# Patient Record
Sex: FEMALE | Race: WHITE | Employment: OTHER | URBAN - METROPOLITAN AREA
[De-identification: names, ages, dates, MRNs, and addresses within clinical notes are randomized per-mention and may not be internally consistent; named-entity substitution may affect disease eponyms.]

---

## 2018-02-05 ENCOUNTER — OFFICE VISIT (OUTPATIENT)
Dept: FAMILY MEDICINE CLINIC | Facility: CLINIC | Age: 33
End: 2018-02-05

## 2018-02-05 VITALS
BODY MASS INDEX: 22.75 KG/M2 | SYSTOLIC BLOOD PRESSURE: 112 MMHG | OXYGEN SATURATION: 97 % | HEART RATE: 78 BPM | WEIGHT: 130 LBS | HEIGHT: 63.5 IN | DIASTOLIC BLOOD PRESSURE: 64 MMHG

## 2018-02-05 DIAGNOSIS — Z12.4 SCREENING FOR CERVICAL CANCER: ICD-10-CM

## 2018-02-05 DIAGNOSIS — Z00.00 LABORATORY EXAMINATION ORDERED AS PART OF A ROUTINE GENERAL MEDICAL EXAMINATION: ICD-10-CM

## 2018-02-05 DIAGNOSIS — N92.0 MENORRHAGIA WITH REGULAR CYCLE: ICD-10-CM

## 2018-02-05 DIAGNOSIS — Z98.51 HISTORY OF BILATERAL TUBAL LIGATION: ICD-10-CM

## 2018-02-05 DIAGNOSIS — Z01.419 WELL FEMALE EXAM WITH ROUTINE GYNECOLOGICAL EXAM: Primary | ICD-10-CM

## 2018-02-05 DIAGNOSIS — R23.4 BREAST SKIN CHANGES: ICD-10-CM

## 2018-02-05 DIAGNOSIS — N63.20 BREAST MASS, LEFT: ICD-10-CM

## 2018-02-05 PROCEDURE — 88175 CYTOPATH C/V AUTO FLUID REDO: CPT | Performed by: FAMILY MEDICINE

## 2018-02-05 PROCEDURE — 99385 PREV VISIT NEW AGE 18-39: CPT | Performed by: FAMILY MEDICINE

## 2018-02-05 PROCEDURE — 87624 HPV HI-RISK TYP POOLED RSLT: CPT | Performed by: FAMILY MEDICINE

## 2018-02-05 RX ORDER — MELATONIN
3 NIGHTLY
COMMUNITY

## 2018-02-05 NOTE — PROGRESS NOTES
HPI:   Corby Love is a 29year old female who presents for a complete physical exam. Symptoms: denies discharge, itching, burning or dysuria, periods are regular, extra heavy  Has to double on the pad lasts 7 days 2004 LEEP procedure normal since.      Ab or pain  CARDIOVASCULAR: denies chest pain or tightness on exertion: no edema  VASCULAR: denies leg cramps  GI: denies abdominal pain, bowel movement changes, blood in stool  : denies urinary problems, vaginal discharge or discomfort,  periods regular examination ordered as part of a routine general medical examination  History of bilateral tubal ligation  Menorrhagia with regular cycle  Breast mass, left  Breast skin changes  Screening for cervical cancer      Orders Placed This Encounter      mmm cbc

## 2018-02-06 LAB — HPV I/H RISK 1 DNA SPEC QL NAA+PROBE: NEGATIVE

## 2018-02-07 ENCOUNTER — TELEPHONE (OUTPATIENT)
Dept: FAMILY MEDICINE CLINIC | Facility: CLINIC | Age: 33
End: 2018-02-07

## 2018-02-07 LAB — LAST PAP RESULT: NORMAL

## 2018-02-08 ENCOUNTER — TELEPHONE (OUTPATIENT)
Dept: FAMILY MEDICINE CLINIC | Facility: CLINIC | Age: 33
End: 2018-02-08

## 2018-02-08 NOTE — TELEPHONE ENCOUNTER
----- Message from Kyra Sepulveda PA-C sent at 2/7/2018  8:41 PM CST -----  Pap normal and HPV is negative

## 2018-02-19 ENCOUNTER — HOSPITAL ENCOUNTER (OUTPATIENT)
Dept: ULTRASOUND IMAGING | Age: 33
Discharge: HOME | End: 2018-02-19
Attending: FAMILY MEDICINE
Payer: MEDICAID

## 2018-02-19 ENCOUNTER — HOSPITAL ENCOUNTER (OUTPATIENT)
Dept: MAMMOGRAPHY | Age: 33
Discharge: HOME | End: 2018-02-19
Attending: FAMILY MEDICINE
Payer: MEDICAID

## 2018-02-19 DIAGNOSIS — N92.0 MENORRHAGIA WITH REGULAR CYCLE: ICD-10-CM

## 2018-02-19 DIAGNOSIS — R23.4 BREAST SKIN CHANGES: ICD-10-CM

## 2018-02-19 DIAGNOSIS — N63.20 BREAST MASS, LEFT: ICD-10-CM

## 2018-02-19 PROCEDURE — 77062 BREAST TOMOSYNTHESIS BI: CPT | Performed by: FAMILY MEDICINE

## 2018-02-19 PROCEDURE — 76830 TRANSVAGINAL US NON-OB: CPT | Performed by: FAMILY MEDICINE

## 2018-02-19 PROCEDURE — 77066 DX MAMMO INCL CAD BI: CPT | Performed by: FAMILY MEDICINE

## 2018-02-19 PROCEDURE — 76642 ULTRASOUND BREAST LIMITED: CPT | Performed by: FAMILY MEDICINE

## 2018-02-19 PROCEDURE — 76856 US EXAM PELVIC COMPLETE: CPT | Performed by: FAMILY MEDICINE

## 2018-02-20 NOTE — PROGRESS NOTES
Six-month follow-up ultrasound bilateral breasts is recommended to evaluate for stability. .  Patient has benign appearing cyst on the left breast.  And right breast benign-appearing density probable fibroadenoma and a small lymph node.

## 2018-02-20 NOTE — PROGRESS NOTES
Enlarged uterus can be seen in adenomyosis. This is a condition in which the inner lining of the uterus (the endometrium) breaks through the muscle wall of the uterus (the myometrium) and causes pain and severe bleeding.   Refer her to gynecologist for fur

## 2018-02-21 ENCOUNTER — TELEPHONE (OUTPATIENT)
Dept: FAMILY MEDICINE CLINIC | Facility: CLINIC | Age: 33
End: 2018-02-21

## 2018-02-21 DIAGNOSIS — N60.02 BREAST CYST, LEFT: ICD-10-CM

## 2018-02-21 DIAGNOSIS — R92.2 BREAST DENSITY: ICD-10-CM

## 2018-02-21 DIAGNOSIS — N85.2 ENLARGED UTERUS: Primary | ICD-10-CM

## 2018-02-21 NOTE — TELEPHONE ENCOUNTER
----- Message from Aaron Marie PA-C sent at 2/20/2018  7:08 AM CST -----  Enlarged uterus can be seen in adenomyosis.   This is a condition in which the inner lining of the uterus (the endometrium) breaks through the muscle wall of the uterus (the florencio

## 2018-02-21 NOTE — TELEPHONE ENCOUNTER
----- Message from Madeline Leger PA-C sent at 2/19/2018  8:28 PM CST -----  Six-month follow-up ultrasound bilateral breasts is recommended to evaluate for stability. .  Patient has benign appearing cyst on the left breast.  And right breast benign-appe

## 2018-02-21 NOTE — TELEPHONE ENCOUNTER
The patient was informed that her mammogram showed a benign appearing cyst on the left breast and a benign-appearing density, probable fibroadenoma and a small lymph node, on her right breast.  The patient was advised that she they recommend a six month fo

## 2018-02-27 ENCOUNTER — LAB ENCOUNTER (OUTPATIENT)
Dept: LAB | Age: 33
End: 2018-02-27
Attending: FAMILY MEDICINE
Payer: MEDICAID

## 2018-02-27 DIAGNOSIS — D64.9 LOW HEMOGLOBIN: ICD-10-CM

## 2018-02-27 DIAGNOSIS — Z00.00 LABORATORY EXAMINATION ORDERED AS PART OF A ROUTINE GENERAL MEDICAL EXAMINATION: ICD-10-CM

## 2018-02-27 LAB
25-HYDROXYVITAMIN D (TOTAL): 22.3 NG/ML (ref 30–100)
ALBUMIN SERPL-MCNC: 3.7 G/DL (ref 3.5–4.8)
ALP LIVER SERPL-CCNC: 65 U/L (ref 37–98)
ALT SERPL-CCNC: 17 U/L (ref 14–54)
AST SERPL-CCNC: 12 U/L (ref 15–41)
BASOPHILS # BLD AUTO: 0.01 X10(3) UL (ref 0–0.1)
BASOPHILS NFR BLD AUTO: 0.2 %
BILIRUB SERPL-MCNC: 0.4 MG/DL (ref 0.1–2)
BUN BLD-MCNC: 13 MG/DL (ref 8–20)
CALCIUM BLD-MCNC: 8.4 MG/DL (ref 8.3–10.3)
CHLORIDE: 107 MMOL/L (ref 101–111)
CHOLEST SMN-MCNC: 149 MG/DL (ref ?–200)
CO2: 26 MMOL/L (ref 22–32)
CREAT BLD-MCNC: 0.54 MG/DL (ref 0.55–1.02)
EOSINOPHIL # BLD AUTO: 0.05 X10(3) UL (ref 0–0.3)
EOSINOPHIL NFR BLD AUTO: 1.2 %
ERYTHROCYTE [DISTWIDTH] IN BLOOD BY AUTOMATED COUNT: 14.9 % (ref 11.5–16)
FREE T4: 0.9 NG/DL (ref 0.9–1.8)
GLUCOSE BLD-MCNC: 86 MG/DL (ref 70–99)
HCT VFR BLD AUTO: 30.3 % (ref 34–50)
HDLC SERPL-MCNC: 56 MG/DL (ref 45–?)
HDLC SERPL: 2.66 {RATIO} (ref ?–4.44)
HGB BLD-MCNC: 9.3 G/DL (ref 12–16)
IMMATURE GRANULOCYTE COUNT: 0.05 X10(3) UL (ref 0–1)
IMMATURE GRANULOCYTE RATIO %: 1.2 %
LDLC SERPL CALC-MCNC: 74 MG/DL (ref ?–130)
LYMPHOCYTES # BLD AUTO: 1.32 X10(3) UL (ref 0.9–4)
LYMPHOCYTES NFR BLD AUTO: 30.4 %
M PROTEIN MFR SERPL ELPH: 6.9 G/DL (ref 6.1–8.3)
MCH RBC QN AUTO: 25.3 PG (ref 27–33.2)
MCHC RBC AUTO-ENTMCNC: 30.7 G/DL (ref 31–37)
MCV RBC AUTO: 82.3 FL (ref 81–100)
MONOCYTES # BLD AUTO: 0.24 X10(3) UL (ref 0.1–1)
MONOCYTES NFR BLD AUTO: 5.5 %
NEUTROPHIL ABS PRELIM: 2.67 X10 (3) UL (ref 1.3–6.7)
NEUTROPHILS # BLD AUTO: 2.67 X10(3) UL (ref 1.3–6.7)
NEUTROPHILS NFR BLD AUTO: 61.5 %
NONHDLC SERPL-MCNC: 93 MG/DL (ref ?–130)
PLATELET # BLD AUTO: 190 10(3)UL (ref 150–450)
POTASSIUM SERPL-SCNC: 4 MMOL/L (ref 3.6–5.1)
RBC # BLD AUTO: 3.68 X10(6)UL (ref 3.8–5.1)
RED CELL DISTRIBUTION WIDTH-SD: 44.7 FL (ref 35.1–46.3)
SODIUM SERPL-SCNC: 140 MMOL/L (ref 136–144)
TRIGL SERPL-MCNC: 96 MG/DL (ref ?–150)
TSI SER-ACNC: 1.63 MIU/ML (ref 0.35–5.5)
VLDLC SERPL CALC-MCNC: 19 MG/DL (ref 5–40)
WBC # BLD AUTO: 4.3 X10(3) UL (ref 4–13)

## 2018-02-27 PROCEDURE — 84443 ASSAY THYROID STIM HORMONE: CPT

## 2018-02-27 PROCEDURE — 80061 LIPID PANEL: CPT

## 2018-02-27 PROCEDURE — 85025 COMPLETE CBC W/AUTO DIFF WBC: CPT

## 2018-02-27 PROCEDURE — 84439 ASSAY OF FREE THYROXINE: CPT

## 2018-02-27 PROCEDURE — 36415 COLL VENOUS BLD VENIPUNCTURE: CPT

## 2018-02-27 PROCEDURE — 83550 IRON BINDING TEST: CPT

## 2018-02-27 PROCEDURE — 82306 VITAMIN D 25 HYDROXY: CPT

## 2018-02-27 PROCEDURE — 82728 ASSAY OF FERRITIN: CPT

## 2018-02-27 PROCEDURE — 80053 COMPREHEN METABOLIC PANEL: CPT

## 2018-02-27 PROCEDURE — 83540 ASSAY OF IRON: CPT

## 2018-02-28 ENCOUNTER — TELEPHONE (OUTPATIENT)
Dept: FAMILY MEDICINE CLINIC | Facility: CLINIC | Age: 33
End: 2018-02-28

## 2018-02-28 DIAGNOSIS — D64.9 LOW HEMOGLOBIN: Primary | ICD-10-CM

## 2018-02-28 DIAGNOSIS — E55.9 VITAMIN D DEFICIENCY: ICD-10-CM

## 2018-02-28 LAB
DEPRECATED HBV CORE AB SER IA-ACNC: 3.2 NG/ML (ref 10–291)
IRON SATURATION: 5 % (ref 13–45)
IRON: 24 UG/DL (ref 28–170)
TOTAL IRON BINDING CAPACITY: 490 UG/DL (ref 298–536)
TRANSFERRIN: 329 MG/DL (ref 200–360)

## 2018-02-28 RX ORDER — ERGOCALCIFEROL 1.25 MG/1
50000 CAPSULE ORAL WEEKLY
Qty: 12 CAPSULE | Refills: 0 | Status: SHIPPED | OUTPATIENT
Start: 2018-02-28 | End: 2018-05-17

## 2018-02-28 NOTE — TELEPHONE ENCOUNTER
The patient was notified that her Vitamin D is low. She was instructed to start prescription Vitamin D 50,000IU once weekly for 12 weeks and then to recheck her Vitamin D level upon completion of the prescription in 3 months.   After the recheck labs she w

## 2018-02-28 NOTE — PROGRESS NOTES
Significant Iron deficiency anemia no iron stores (ferritin). Start ferrous sulfate  mg twice daily. Advise may cause constipation use OTC Miralax or stool softer if needed.   CBC in 1 month   CBC, ferritin, iron study in 3 months

## 2018-02-28 NOTE — PROGRESS NOTES
Lab results showed low Vitamin D. Advise RX vitamin D 50,000 IU once weekly for 12 weeks. Recheck vitamin D level in 3 months. After labs we will direct you on the dose of vitamin D needed OTC.   Please add iron study and ferritin due to low hemoglobin  R

## 2018-02-28 NOTE — TELEPHONE ENCOUNTER
The patient reported that she recently started taking a breast enhancement supplement called 100% [employer], which has 1,200 mg of Calcium and an array of other natural ingredients.   She is wondering if that would be okay to continue to take with the

## 2018-02-28 NOTE — TELEPHONE ENCOUNTER
----- Message from Leon Seymour PA-C sent at 2/27/2018 10:35 PM CST -----  Lab results showed low Vitamin D. Advise RX vitamin D 50,000 IU once weekly for 12 weeks. Recheck vitamin D level in 3 months.   After labs we will direct you on the dose of vi

## 2018-03-01 ENCOUNTER — TELEPHONE (OUTPATIENT)
Dept: FAMILY MEDICINE CLINIC | Facility: CLINIC | Age: 33
End: 2018-03-01

## 2018-03-01 DIAGNOSIS — D50.9 IRON DEFICIENCY ANEMIA, UNSPECIFIED IRON DEFICIENCY ANEMIA TYPE: Primary | ICD-10-CM

## 2018-03-01 NOTE — TELEPHONE ENCOUNTER
----- Message from Tamar Flores PA-C sent at 2/28/2018  5:46 PM CST -----  Significant Iron deficiency anemia no iron stores (ferritin). Start ferrous sulfate  mg twice daily.   Advise may cause constipation use OTC Miralax or stool softer if n

## 2018-03-08 ENCOUNTER — TELEPHONE (OUTPATIENT)
Dept: OBGYN CLINIC | Facility: CLINIC | Age: 33
End: 2018-03-08

## 2018-03-08 NOTE — TELEPHONE ENCOUNTER
Talked to patient and informed her that she had missed her appointment and that there was a no show fee. Patient was fine with that and will call to reschedule.  She is out of town

## (undated) NOTE — LETTER
07/02/18        Miller Lopez  [address]  [id number]      Dear Mason Márquez,    [address] [facility] records indicate that you have outstanding lab work and or testing that was ordered for you and has not yet been completed:          CBC W Differential W Plate